# Patient Record
Sex: FEMALE | URBAN - METROPOLITAN AREA
[De-identification: names, ages, dates, MRNs, and addresses within clinical notes are randomized per-mention and may not be internally consistent; named-entity substitution may affect disease eponyms.]

---

## 2020-05-14 ENCOUNTER — OFFICE VISIT (OUTPATIENT)
Dept: URBAN - METROPOLITAN AREA CLINIC 58 | Facility: CLINIC | Age: 45
End: 2020-05-14
Payer: COMMERCIAL

## 2020-05-14 DIAGNOSIS — H52.11 MYOPIA, RIGHT EYE: Primary | ICD-10-CM

## 2020-05-14 PROCEDURE — 92002 INTRM OPH EXAM NEW PATIENT: CPT | Performed by: OPTOMETRIST

## 2020-05-14 ASSESSMENT — INTRAOCULAR PRESSURE
OD: 14
OS: 14

## 2020-05-14 ASSESSMENT — KERATOMETRY
OS: 42.13
OD: 42.00

## 2020-05-14 ASSESSMENT — VISUAL ACUITY
OS: 20/20
OD: 20/20

## 2020-05-14 NOTE — IMPRESSION/PLAN
Impression: Myopia of right eye: H52.11. Plan: Discussed diagnosis in detail with patient. No treatment is required at this time. Will continue to observe condition and or symptoms. Call if [address] worsens.

## 2021-12-22 ENCOUNTER — OFFICE VISIT (OUTPATIENT)
Dept: URBAN - METROPOLITAN AREA CLINIC 58 | Facility: CLINIC | Age: 46
End: 2021-12-22
Payer: COMMERCIAL

## 2021-12-22 PROCEDURE — 92012 INTRM OPH EXAM EST PATIENT: CPT | Performed by: OPTOMETRIST

## 2021-12-22 ASSESSMENT — INTRAOCULAR PRESSURE
OD: 15
OS: 18

## 2021-12-22 ASSESSMENT — VISUAL ACUITY
OS: 20/20
OD: 20/20

## 2021-12-22 NOTE — IMPRESSION/PLAN
Impression: Myopia, right eye: H52.11. Plan: Diagnosis discussed. SRx released, pt edu that a temporary adjustment is expected.